# Patient Record
Sex: MALE | Race: BLACK OR AFRICAN AMERICAN | URBAN - METROPOLITAN AREA
[De-identification: names, ages, dates, MRNs, and addresses within clinical notes are randomized per-mention and may not be internally consistent; named-entity substitution may affect disease eponyms.]

---

## 2019-03-26 ENCOUNTER — OFFICE VISIT (OUTPATIENT)
Dept: URGENT CARE | Age: 24
End: 2019-03-26

## 2019-03-26 ENCOUNTER — TELEPHONE (OUTPATIENT)
Dept: URGENT CARE | Age: 24
End: 2019-03-26

## 2019-03-26 VITALS
OXYGEN SATURATION: 97 % | WEIGHT: 259 LBS | TEMPERATURE: 97.7 F | RESPIRATION RATE: 16 BRPM | DIASTOLIC BLOOD PRESSURE: 97 MMHG | SYSTOLIC BLOOD PRESSURE: 150 MMHG | HEART RATE: 78 BPM

## 2019-03-26 DIAGNOSIS — M79.10 MUSCLE PAIN: Primary | ICD-10-CM

## 2019-03-26 LAB
ANION GAP BLD CALC-SCNC: 17 MMOL/L
BUN BLD-MCNC: 10 MG/DL
CHLORIDE BLD-SCNC: 106 MMOL/L
CO2 BLD-SCNC: 25 MMOL/L
CREAT BLD-MCNC: 1 MG/DL (ref 0.6–1.3)
GLUCOSE BLD STRIP.AUTO-MCNC: 115 MG/DL
HCT VFR BLD CALC: 44 %
HGB BLD-MCNC: 15 G/DL
IONIZED CALCIUM ISTA,ICAI: 1.22
POTASSIUM BLD-SCNC: 3.8 MMOL/L
SODIUM BLD-SCNC: 143 MMOL/L

## 2019-03-26 NOTE — PROGRESS NOTES
Lab returned via fax  Abnormal CK (HIGH): 9276 Units per liter (result faxed in EMR)  Patient will be called and referred to ED for further evaluation and management.

## 2019-03-26 NOTE — PATIENT INSTRUCTIONS
We should call your today with your test results so make sure to have your phone available. Muscle Aches: Care Instructions  Your Care Instructions  Muscle aches have many possible causes. Some common ones are overuse, tension, and injuries such as a strained muscle. An infection such as the flu can cause muscle aches. Or the aches may be caused by some medicines, such as statins. Muscle aches may also be a symptom of a disease like lupus or fibromyalgia. Myalgia is the medical term for muscle aches. The doctor will do a physical exam and ask questions to try to find what is causing your pain. You may also have tests such as blood tests or imaging tests like X-rays. These can help find or rule out serious problems. The doctor has checked you carefully, but problems can develop later. If you notice any problems or new symptoms, get medical treatment right away. Follow-up care is a key part of your treatment and safety. Be sure to make and go to all appointments, and call your doctor if you are having problems. It's also a good idea to know your test results and keep a list of the medicines you take. How can you care for yourself at home? · Rest the area that hurts. You may need to stop or reduce the activity that causes your symptoms. Then you can return to it slowly. · Put ice or a cold pack on the area for 10 to 20 minutes at a time to ease pain. Put a thin cloth between the ice and your skin. · Take an over-the-counter pain medicine, such as acetaminophen (Tylenol), ibuprofen (Advil, Motrin), or naproxen (Aleve). Be safe with medicines. Read and follow all instructions on the label. When should you call for help? Call your doctor now or seek immediate medical care if:  · Your pain gets worse. · You have new symptoms, such as a fever, swelling, or a rash. · You have new or worse muscle pain. · You have less urine than normal or no urine. · You have new swelling in your arms or feet. · You have blood in your urine. ·   Watch closely for changes in your health, and be sure to contact your doctor if:  · You do not get better as expected. Where can you learn more? Go to [employer]  Enter G355 in the search box to learn more about \"Muscle Aches: Care Instructions. \"   © 2432-8887 [employer]. Care instructions adapted under license by [facility] (which disclaims liability or warranty for this information). This care instruction is for use with your licensed healthcare professional. If you have questions about a medical condition or this instruction, always ask your healthcare professional. [place] [address] any warranty or liability for your use of this information.   Content Version: 48.4.302340; Current as of: November 20, 2015

## 2019-03-26 NOTE — PROGRESS NOTES
Here for \"bicep and brachialis\" pain   Onset yesterday after doing two intense resistance training workouts in 1 day. He lifts regularly. Concerned because he cant straighten arm all the way without significant pain. There was no acute injury  He denies any feelings of fatigue, fevers, dark urine, back pain, nausea, decrease in urine output. Feels well otherwise. Has not tried any meds. Overall not improved  Denies any known underlying medical conditions  Requesting work note. History reviewed. No pertinent past medical history. History reviewed. No pertinent surgical history. History reviewed. No pertinent family history.      Social History     Socioeconomic History    Marital status: SINGLE     Spouse name: Not on file    Number of children: Not on file    Years of education: Not on file    Highest education level: Not on file   Occupational History    Not on file   Social Needs    Financial resource strain: Not on file    Food insecurity:     Worry: Not on file     Inability: Not on file    Transportation needs:     Medical: Not on file     Non-medical: Not on file   Tobacco Use    Smoking status: Not on file   Substance and Sexual Activity    Alcohol use: Not on file    Drug use: Not on file    Sexual activity: Not on file   Lifestyle    Physical activity:     Days per week: Not on file     Minutes per session: Not on file    Stress: Not on file   Relationships    Social connections:     Talks on phone: Not on file     Gets together: Not on file     Attends [employer] service: Not on file     Active member of club or organization: Not on file     Attends meetings of clubs or organizations: Not on file     Relationship status: Not on file    Intimate partner violence:     Fear of current or ex partner: Not on file     Emotionally abused: Not on file     Physically abused: Not on file     Forced sexual activity: Not on file   Other Topics Concern    Not on file   Social History Narrative    Not on file                ALLERGIES: Patient has no allergy information on record. Review of Systems   All other systems reviewed and are negative. Vitals:    03/26/19 1048   BP: (!) 150/97   Pulse: 78   Resp: 16   Temp: 97.7 °F (36.5 °C)   SpO2: 97%   Weight: 259 lb (117.5 kg)       Physical Exam   Constitutional: He is oriented to person, place, and time. No distress. HENT:   Mouth/Throat: Oropharynx is clear and moist.   Eyes: EOM are normal.   Cardiovascular: Normal rate, regular rhythm and normal heart sounds. Exam reveals no gallop and no friction rub. No murmur heard. Pulmonary/Chest: Effort normal and breath sounds normal. No respiratory distress. He has no wheezes. He has no rales. Musculoskeletal:        Arms:  Neurological: He is alert and oriented to person, place, and time. Skin: Skin is warm and dry. No rash noted. He is not diaphoretic. No pallor. Psychiatric: He has a normal mood and affect. His behavior is normal. Thought content normal.       MDM     Differential Diagnosis; Clinical Impression; Plan:       CLINICAL IMPRESSION:  (M79.10) Muscle pain  (primary encounter diagnosis)    Orders Placed This Encounter      CK      AMB POC ISTAT CHEM 8+    Plan:  Delayed onset muscle soreness. DDx rhabdomyolysis   Chem 8 unremarkable  Stat CK ordered; should call with results this evening. Patient has declined IV fluids; aware to increase fluid intake  To call office for any new, worsening or changes  If CK elevated will refer to ED   No workouts for next few days. We have reviewed concerning signs/symptoms, normal vs abnormal progression of medical condition and when to seek immediate medical attention. Advised re eval immediately for any new, worsening or changes.              Procedures

## 2019-03-26 NOTE — LETTER
NOTIFICATION RETURN TO WORK / SCHOOL 
 
3/26/2019 12:00 PM 
 
Mr. Roxanne Jacobsen [facility] [phone] [id number] To Whom It May Concern: 
 
Roxanne Jacobsen is currently under the care of [address] [place] [address]. He will return to work/school on: 03/28 OR 03/29/2019 If there are questions or concerns please have the patient contact our office. Sincerely, E PROVIDER

## 2019-03-27 NOTE — TELEPHONE ENCOUNTER
See progress note. Patient with abnormal lab (elevated CK) and needs to go to ED for further evaluation/management ASAP. I personally have attempted to call multiple times and have left voicemail x 2. Last voicemail left advised him to go to a [facility] Emergency department.

## 2019-03-27 NOTE — TELEPHONE ENCOUNTER
Pt made aware of elevated CK and risk for muscle breakdown. Pt instructed to go to the ED for further treatment at this time.